# Patient Record
Sex: FEMALE | Race: OTHER | Employment: FULL TIME | URBAN - METROPOLITAN AREA
[De-identification: names, ages, dates, MRNs, and addresses within clinical notes are randomized per-mention and may not be internally consistent; named-entity substitution may affect disease eponyms.]

---

## 2018-03-19 ENCOUNTER — OFFICE VISIT (OUTPATIENT)
Dept: FAMILY MEDICINE CLINIC | Facility: CLINIC | Age: 43
End: 2018-03-19

## 2018-03-19 VITALS
SYSTOLIC BLOOD PRESSURE: 100 MMHG | TEMPERATURE: 98 F | DIASTOLIC BLOOD PRESSURE: 70 MMHG | HEART RATE: 72 BPM | WEIGHT: 124 LBS | RESPIRATION RATE: 18 BRPM | HEIGHT: 61 IN | BODY MASS INDEX: 23.41 KG/M2

## 2018-03-19 DIAGNOSIS — H61.23 BILATERAL IMPACTED CERUMEN: Primary | ICD-10-CM

## 2018-03-19 DIAGNOSIS — H65.92 FLUID LEVEL BEHIND TYMPANIC MEMBRANE OF LEFT EAR: ICD-10-CM

## 2018-03-19 PROCEDURE — 69209 REMOVE IMPACTED EAR WAX UNI: CPT | Performed by: FAMILY MEDICINE

## 2018-03-19 PROCEDURE — 99213 OFFICE O/P EST LOW 20 MIN: CPT | Performed by: FAMILY MEDICINE

## 2018-03-20 NOTE — PROGRESS NOTES
Marissa Duncan is a 44year old female. Patient presents with:  Ear Pain: per pt, left ear pain and ear is clogged    HPI:   Puneet Paulson presents to the office with complaints of ear pain. Ear is bothering her for about a week. Thinks it's clogged.  Thin Still some fullness, but she does have a small middle ear effusion. The patient indicates understanding of these issues and agrees to the plan. The patient is asked to return if symptoms persist or worsen.     Bilateral impacted cerumen  (primary encount

## 2019-05-24 ENCOUNTER — TELEPHONE (OUTPATIENT)
Dept: FAMILY MEDICINE CLINIC | Facility: CLINIC | Age: 44
End: 2019-05-24

## 2019-05-24 ENCOUNTER — HOSPITAL ENCOUNTER (OUTPATIENT)
Age: 44
Discharge: HOME | End: 2019-05-24
Attending: EMERGENCY MEDICINE
Payer: COMMERCIAL

## 2019-05-24 VITALS
WEIGHT: 120 LBS | TEMPERATURE: 98 F | OXYGEN SATURATION: 100 % | HEIGHT: 61 IN | BODY MASS INDEX: 22.66 KG/M2 | RESPIRATION RATE: 16 BRPM | DIASTOLIC BLOOD PRESSURE: 70 MMHG | HEART RATE: 60 BPM | SYSTOLIC BLOOD PRESSURE: 120 MMHG

## 2019-05-24 DIAGNOSIS — G43.909 MIGRAINE WITHOUT STATUS MIGRAINOSUS, NOT INTRACTABLE, UNSPECIFIED MIGRAINE TYPE: Primary | ICD-10-CM

## 2019-05-24 PROCEDURE — 99212 OFFICE O/P EST SF 10 MIN: CPT

## 2019-05-24 NOTE — ED INITIAL ASSESSMENT (HPI)
Pt was at work today and working on computer. Pt states started with headache, pain to eyes. Pt with dizziness and nausea. Pt left work. Pt vomited x1. Pt took motrin and took a nap. Pt states headache resolved but feels \"congested\" to head.   Pt ca

## 2019-05-24 NOTE — TELEPHONE ENCOUNTER
Patient schedule for an appt today with Dr Vance Rowland for \"dizziness and feeling of passing out\". Call to patient. States she was at work at her desk and felt dizzy, had horrible constant pain in the back of her head-no injury-no hx of migraines.   Drove home

## 2019-05-24 NOTE — ED PROVIDER NOTES
Patient Seen in: [id number] [facility] [place]    History   Patient presents with:  Headache (neurologic)    Stated Complaint: headache type pain/nausea    HPI    This is a 81-year-old female complaint of headache the patient states she was at work [facility] Exam    Patient is alert orient x3 no acute distress HEENT exam pupils are equal round react light extra muscles intact oropharynx clear tympanic membranes normal the neck is supple there is no nuchal rigidity lymphadenopathy or JVD lungs are clear to ausc

## 2020-02-03 ENCOUNTER — HOSPITAL ENCOUNTER (OUTPATIENT)
Age: 45
Discharge: HOME | End: 2020-02-03
Attending: FAMILY MEDICINE
Payer: COMMERCIAL

## 2020-02-03 VITALS
TEMPERATURE: 98 F | RESPIRATION RATE: 16 BRPM | HEART RATE: 66 BPM | WEIGHT: 124 LBS | OXYGEN SATURATION: 99 % | BODY MASS INDEX: 23 KG/M2 | SYSTOLIC BLOOD PRESSURE: 124 MMHG | DIASTOLIC BLOOD PRESSURE: 83 MMHG

## 2020-02-03 DIAGNOSIS — M77.8 TENDINITIS OF EXTENSOR TENDON OF RIGHT HAND: Primary | ICD-10-CM

## 2020-02-03 PROCEDURE — 29130 APPL FINGER SPLINT STATIC: CPT

## 2020-02-03 PROCEDURE — 99212 OFFICE O/P EST SF 10 MIN: CPT

## 2020-02-03 PROCEDURE — 99213 OFFICE O/P EST LOW 20 MIN: CPT

## 2020-02-04 NOTE — ED INITIAL ASSESSMENT (HPI)
Pt here c/o rt hand pain for last month. Denies any injury. States pain is a shooting pain from 3rd finger down the hand.

## 2020-02-04 NOTE — ED PROVIDER NOTES
Patient Seen in: [address] [facility] [place]      History   Patient presents with:  Hand Pain    Stated Complaint: hand pain    HPI    **43-year-old female presents to the immediate care today with chief complaints of pain along the extensor tendon o No swelling. No crepitus. No ecchymosis or bruising. No significant tenderness over the third phalanx. Dorsiflexion of the index finger reproduces the pain. Likely extensor tendinitis of the right third digit. Tendons are intact. Good cap refill.   Magui Khan

## 2023-07-31 ENCOUNTER — HOSPITAL ENCOUNTER (OUTPATIENT)
Age: 48
Discharge: HOME | End: 2023-07-31

## 2023-07-31 VITALS
HEIGHT: 61 IN | HEART RATE: 63 BPM | OXYGEN SATURATION: 98 % | RESPIRATION RATE: 18 BRPM | SYSTOLIC BLOOD PRESSURE: 127 MMHG | WEIGHT: 126 LBS | BODY MASS INDEX: 23.79 KG/M2 | DIASTOLIC BLOOD PRESSURE: 74 MMHG | TEMPERATURE: 98 F

## 2023-07-31 DIAGNOSIS — H61.21 IMPACTED CERUMEN OF RIGHT EAR: Primary | ICD-10-CM

## 2023-07-31 PROCEDURE — 99203 OFFICE O/P NEW LOW 30 MIN: CPT | Performed by: PHYSICIAN ASSISTANT

## 2023-07-31 NOTE — DISCHARGE INSTRUCTIONS
No Qtips or airpods into the canal   Follow up with primary care doctor  Return to the ER if symptoms worsen

## 2023-07-31 NOTE — ED INITIAL ASSESSMENT (HPI)
Pt thinks that she has an earwax that was pushed into the right ear.  Pt states noise is muffled and now having pain

## 2024-06-05 ENCOUNTER — APPOINTMENT (OUTPATIENT)
Dept: CT IMAGING | Age: 49
End: 2024-06-05
Attending: NURSE PRACTITIONER

## 2024-06-05 ENCOUNTER — HOSPITAL ENCOUNTER (OUTPATIENT)
Age: 49
Discharge: HOME | End: 2024-06-05

## 2024-06-05 VITALS
SYSTOLIC BLOOD PRESSURE: 111 MMHG | OXYGEN SATURATION: 100 % | WEIGHT: 130 LBS | HEART RATE: 58 BPM | BODY MASS INDEX: 24.55 KG/M2 | RESPIRATION RATE: 18 BRPM | HEIGHT: 61 IN | TEMPERATURE: 98 F | DIASTOLIC BLOOD PRESSURE: 76 MMHG

## 2024-06-05 DIAGNOSIS — S00.83XA CONTUSION OF FACE, INITIAL ENCOUNTER: ICD-10-CM
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
## 2024-06-05 DIAGNOSIS — R19.7 DIARRHEA, UNSPECIFIED TYPE: Primary | ICD-10-CM

## 2024-06-05 DIAGNOSIS — S09.90XA INJURY OF HEAD, INITIAL ENCOUNTER: ICD-10-CM

## 2024-06-05 DIAGNOSIS — R55 VAGAL REACTION: ICD-10-CM

## 2024-06-05 LAB
#MXD IC: 0.3 X10ˆ3/UL (ref 0.1–1)
ATRIAL RATE: 59 BPM
B-HCG UR QL: NEGATIVE
BILIRUB UR QL STRIP: NEGATIVE
BUN BLD-MCNC: 11 MG/DL (ref 7–18)
CHLORIDE BLD-SCNC: 104 MMOL/L (ref 98–112)
CLARITY UR: CLEAR
CO2 BLD-SCNC: 23 MMOL/L (ref 21–32)
COLOR UR: YELLOW
CREAT BLD-MCNC: 0.6 MG/DL
EGFRCR SERPLBLD CKD-EPI 2021: 111 ML/MIN/1.73M2 (ref 60–?)
GLUCOSE BLD-MCNC: 106 MG/DL (ref 70–99)
GLUCOSE UR STRIP-MCNC: NEGATIVE MG/DL
HCT VFR BLD AUTO: 40.6 %
HCT VFR BLD CALC: 42 %
HGB BLD-MCNC: 13.3 G/DL
ISTAT IONIZED CALCIUM FOR CHEM 8: 1.24 MMOL/L (ref 1.12–1.32)
KETONES UR STRIP-MCNC: NEGATIVE MG/DL
LEUKOCYTE ESTERASE UR QL STRIP: NEGATIVE
LYMPHOCYTES # BLD AUTO: 1.8 X10ˆ3/UL (ref 1–4)
LYMPHOCYTES NFR BLD AUTO: 23.7 %
MCH RBC QN AUTO: 29.8 PG (ref 26–34)
MCHC RBC AUTO-ENTMCNC: 32.8 G/DL (ref 31–37)
MCV RBC AUTO: 91 FL (ref 80–100)
MIXED CELL %: 4.6 %
NEUTROPHILS # BLD AUTO: 5.4 X10ˆ3/UL (ref 1.5–7.7)
NEUTROPHILS NFR BLD AUTO: 71.7 %
NITRITE UR QL STRIP: NEGATIVE
P AXIS: 65 DEGREES
P-R INTERVAL: 166 MS
PH UR STRIP: 5 [PH]
PLATELET # BLD AUTO: 308 X10ˆ3/UL (ref 150–450)
POCT INFLUENZA A: NEGATIVE
POCT INFLUENZA B: NEGATIVE
POTASSIUM BLD-SCNC: 4 MMOL/L (ref 3.6–5.1)
PROT UR STRIP-MCNC: NEGATIVE MG/DL
Q-T INTERVAL: 400 MS
QRS DURATION: 84 MS
QTC CALCULATION (BEZET): 396 MS
R AXIS: 15 DEGREES
RBC # BLD AUTO: 4.46 X10ˆ6/UL
SODIUM BLD-SCNC: 139 MMOL/L (ref 136–145)
SP GR UR STRIP: >=1.03
T AXIS: 67 DEGREES
TROPONIN I BLD-MCNC: <0.02 NG/ML
UROBILINOGEN UR STRIP-ACNC: <2 MG/DL
VENTRICULAR RATE: 59 BPM
WBC # BLD AUTO: 7.5 X10ˆ3/UL (ref 4–11)

## 2024-06-05 PROCEDURE — 93000 ELECTROCARDIOGRAM COMPLETE: CPT | Performed by: NURSE PRACTITIONER

## 2024-06-05 PROCEDURE — 80047 BASIC METABLC PNL IONIZED CA: CPT | Performed by: NURSE PRACTITIONER

## 2024-06-05 PROCEDURE — 76376 3D RENDER W/INTRP POSTPROCES: CPT | Performed by: NURSE PRACTITIONER

## 2024-06-05 PROCEDURE — 70486 CT MAXILLOFACIAL W/O DYE: CPT | Performed by: NURSE PRACTITIONER

## 2024-06-05 PROCEDURE — 99204 OFFICE O/P NEW MOD 45 MIN: CPT | Performed by: NURSE PRACTITIONER

## 2024-06-05 PROCEDURE — 96365 THER/PROPH/DIAG IV INF INIT: CPT | Performed by: NURSE PRACTITIONER

## 2024-06-05 PROCEDURE — 81025 URINE PREGNANCY TEST: CPT | Performed by: NURSE PRACTITIONER

## 2024-06-05 PROCEDURE — 85025 COMPLETE CBC W/AUTO DIFF WBC: CPT | Performed by: NURSE PRACTITIONER

## 2024-06-05 PROCEDURE — 70450 CT HEAD/BRAIN W/O DYE: CPT | Performed by: NURSE PRACTITIONER

## 2024-06-05 PROCEDURE — 81002 URINALYSIS NONAUTO W/O SCOPE: CPT | Performed by: NURSE PRACTITIONER

## 2024-06-05 PROCEDURE — 87502 INFLUENZA DNA AMP PROBE: CPT | Performed by: NURSE PRACTITIONER

## 2024-06-05 PROCEDURE — 84484 ASSAY OF TROPONIN QUANT: CPT | Performed by: NURSE PRACTITIONER

## 2024-06-05 RX ORDER — SODIUM CHLORIDE 9 MG/ML
1000 INJECTION, SOLUTION INTRAVENOUS ONCE
Status: COMPLETED | OUTPATIENT
Start: 2024-06-05 | End: 2024-06-05

## 2024-06-05 RX ORDER — ONDANSETRON 2 MG/ML
4 INJECTION INTRAMUSCULAR; INTRAVENOUS ONCE
Status: COMPLETED | OUTPATIENT
Start: 2024-06-05 | End: 2024-06-05

## 2024-06-05 NOTE — DISCHARGE INSTRUCTIONS
Please increase your electrolyte rich fluids while you are experiencing loose stool.  Please follow-up with your primary care provider tomorrow.  If you develop any projectile vomiting or severe headache with visual changes please go to the emergency room.

## 2024-06-05 NOTE — ED INITIAL ASSESSMENT (HPI)
Patient reports that this morning was sitting on toilet having severe abdominal cramping \"like I was going to have diarrhea\" and passed out.   heard patient fall and found patient unresponsive on floor. Patient states she \"blacked out\"  Patient states she had diarrhea after passing out and now is nauseated and fatigued.

## 2024-06-05 NOTE — ED PROVIDER NOTES
Patient Seen in: [facility]      History     Chief Complaint   Patient presents with    Syncope    Nausea/Vomiting/Diarrhea     Stated Complaint: passed out fall concussion    Subjective:   HPI    49-year-old female presents today with complaints of a syncopal episode that happened this morning.  Patient states she woke up dazed and confused and felt like she was going to have diarrhea so she went to the bathroom and was having severe abdominal pain and prayed to Alek to take the pain away and then she woke up on the floor facedown.  Patient's  is present with her and states that he heard a thump and ran upstairs and she was facedown on the bathroom floor.  Patient states that she came to and had pain to the left side of her face and the abdominal pain was gone.  Patient states that she did have diarrhea.  Patient denies any known COVID or flu exposure.  Patient denies any history of concussion.  Patient denies any recent travel.  Patient denies any family history of cardiac events.  Patient states that she still feels nauseous.  Patient states that her last period was months ago.  Patient states she is taken pregnancy test and they were all negative.    Objective:   History reviewed. No pertinent past medical history.           History reviewed. No pertinent surgical history.             Social History     Socioeconomic History    Marital status:    Tobacco Use    Smoking status: Never    Smokeless tobacco: Never   Substance and Sexual Activity    Alcohol use: No    Drug use: No              Review of Systems   Constitutional: Negative.    HENT: Negative.     Eyes: Negative.    Respiratory: Negative.     Cardiovascular: Negative.    Gastrointestinal:  Positive for diarrhea and nausea.   Endocrine: Negative.    Genitourinary: Negative.    Musculoskeletal: Negative.    Skin: Negative.    Allergic/Immunologic: Negative.    Neurological:  Positive for syncope and headaches.   Hematological:  Negative.    Psychiatric/Behavioral: Negative.         Positive for stated complaint: passed out fall concussion  Other systems are as noted in HPI.  Constitutional and vital signs reviewed.      All other systems reviewed and negative except as noted above.    Physical Exam     ED Triage Vitals [06/05/24 0818]   /85   Pulse 73   Resp 18   Temp 97.3 °F (36.3 °C)   Temp src Temporal   SpO2 99 %   O2 Device None (Room air)       Current Vitals:   Vital Signs  BP: (!) 111/91  Pulse: 69  Resp: 18  Temp: 97.3 °F (36.3 °C)  Temp src: Temporal    Oxygen Therapy  SpO2: 99 %  O2 Device: None (Room air)            Physical Exam  Vitals and nursing note reviewed. Exam conducted with a chaperone present.   Constitutional:       Appearance: She is well-developed.   HENT:      Head:      Comments: Ecchymosis appreciated to the left side of the face.  Negative Rivera sign.     Mouth/Throat:      Mouth: Mucous membranes are moist.      Pharynx: Oropharynx is clear.   Eyes:      Extraocular Movements: Extraocular movements intact.      Pupils: Pupils are equal, round, and reactive to light.   Cardiovascular:      Rate and Rhythm: Normal rate and regular rhythm.      Heart sounds: Normal heart sounds.   Pulmonary:      Effort: Pulmonary effort is normal.      Breath sounds: Normal breath sounds.   Abdominal:      General: Abdomen is flat. Bowel sounds are normal.      Palpations: Abdomen is soft.      Tenderness: There is no abdominal tenderness.   Skin:     Capillary Refill: Capillary refill takes less than 2 seconds.   Neurological:      General: No focal deficit present.      Mental Status: She is alert and oriented to person, place, and time.      Comments: No acute neurological deficit appreciated.   Psychiatric:         Mood and Affect: Mood normal.             ED Course     Labs Reviewed   [facility] POCT URINALYSIS DIPSTICK - Abnormal; Notable for the following components:       Result Value    Blood, Urine Trace-Intact (*)      All other components within normal limits   POCT ISTAT CHEM8 CARTRIDGE - Abnormal; Notable for the following components:    ISTAT Glucose 106 (*)     All other components within normal limits   POCT PREGNANCY URINE - Normal   ISTAT TROPONIN - Normal   POCT FLU TEST - Normal    Narrative:     This assay is a rapid molecular in vitro test utilizing nucleic acid amplification of influenza A and B viral RNA.   POCT CBC     EKG    Rate, intervals and axes as noted on EKG Report.  Rate: 59  Rhythm: Sinus Rhythm  Reading: SB                          MDM        49-year-old female presents today with complaints of a syncopal episode that happened this morning.  Patient states she woke up dazed and confused and felt like she was going to have diarrhea so she went to the bathroom and was having severe abdominal pain and prayed to Alek to take the pain away and then she woke up on the floor facedown.  Patient's  is present with her and states that he heard a thump and ran upstairs and she was facedown on the bathroom floor.  Patient states that she came to and had pain to the left side of her face and the abdominal pain was gone.  Patient states that she did have diarrhea.  Patient denies any known COVID or flu exposure.  Patient denies any history of concussion.  Patient denies any recent travel.  Patient denies any family history of cardiac events.  Patient states that she still feels nauseous.  Patient states that her last period was months ago.  Patient states she is taken pregnancy test and they were all negative.  Vital signs: Please see EMR.  Physical exam: Please see exam.  Differential diagnosis: Concussion, intracranial process, fracture, cardiac event, dehydration, influenza.  Recent Results (from the past 24 hour(s))   POCT Flu Test    Collection Time: 06/05/24  8:32 AM    Specimen: Nares; Other   Result Value Ref Range    POCT INFLUENZA A Negative Negative    POCT INFLUENZA B Negative Negative   EKG 12  Lead    Collection Time: 06/05/24  8:38 AM   Result Value Ref Range    Ventricular rate 59 BPM    Atrial rate 59 BPM    P-R Interval 166 ms    QRS Duration 84 ms    Q-T Interval 400 ms    QTC Calculation (Bezet) 396 ms    P Axis 65 degrees    R Axis 15 degrees    T Axis 67 degrees   POCT Urinalysis Dipstick    Collection Time: 06/05/24  8:51 AM   Result Value Ref Range    Urine Color Yellow Yellow    Urine Clarity Clear Clear    Specific Gravity, Urine >=1.030 1.005 - 1.030    PH, Urine 5.0 5.0 - 8.0    Protein urine Negative Negative mg/dL    Glucose, Urine Negative Negative mg/dL    Ketone, Urine Negative Negative mg/dL    Bilirubin, Urine Negative Negative    Blood, Urine Trace-Intact (A) Negative    Nitrite Urine Negative Negative    Urobilinogen urine <2.0 <2.0 mg/dL    Leukocyte esterase urine Negative Negative   POCT CBC    Collection Time: 06/05/24  8:53 AM   Result Value Ref Range    WBC IC 7.5 4.0 - 11.0 x10ˆ3/uL    RBC IC 4.46 3.80 - 5.30 X10ˆ6/uL    HGB IC 13.3 12.0 - 16.0 g/dL    HCT IC 40.6 35.0 - 48.0 %    MCV IC 91.0 80.0 - 100.0 fL    MCH IC 29.8 26.0 - 34.0 pg    MCHC IC 32.8 31.0 - 37.0 g/dL    PLT .0 150.0 - 450.0 X10ˆ3/uL    # Neutrophil 5.4 1.5 - 7.7 X10ˆ3/uL    # Lymphocyte 1.8 1.0 - 4.0 X10ˆ3/uL    # Mixed Cells 0.3 0.1 - 1.0 X10ˆ3/uL    Neutrophil % 71.7 %    Lymphocyte % 23.7 %    Mixed Cell % 4.6 %   POCT Pregnancy, Urine    Collection Time: 06/05/24  8:54 AM   Result Value Ref Range    POCT Urine Pregnancy Negative Negative   POCT ISTAT chem8 cartridge    Collection Time: 06/05/24  8:58 AM   Result Value Ref Range    ISTAT Sodium 139 136 - 145 mmol/L    ISTAT BUN 11 7 - 18 mg/dL    ISTAT Potassium 4.0 3.6 - 5.1 mmol/L    ISTAT Chloride 104 98 - 112 mmol/L    ISTAT Ionized Calcium 1.24 1.12 - 1.32 mmol/L    ISTAT Hematocrit 42 34 - 50 %    ISTAT Glucose 106 (H) 70 - 99 mg/dL    ISTAT TCO2 23 21 - 32 mmol/L    ISTAT Creatinine 0.60 0.55 - 1.02 mg/dL    eGFR-Cr 111 >=60 mL/min/1.73m2    ISTAT Troponin    Collection Time: 06/05/24  9:00 AM   Result Value Ref Range    ISTAT Troponin <0.02 <0.045 ng/mL ng/mL     Vital signs not exhibiting orthostatic changes.    Heart Score:    HEART Score      Title      Criteria Score   Age: 45-64 Age Score: 1   History: Slightly Suspicious Hx Score: 0     EKG: Normal EKG Score: 0   HTN: No   Hypercholesterolemia: No   Atherosclerosis/PVD: No     DM: No   BMI>30kg/m2: No   Smoking: No   Family History: No         Other Risk Factor Score: 0               Lab Results   Component Value Date    ITROP <0.02 06/05/2024         HEART Score: 1        Risk of adverse cardiac event is 0.9-1.7%          [place] CT: Consider for CT.   CT FACIAL BONES (CPT=70486)    Result Date: 6/5/2024  CONCLUSION:  No acute osseous injuries or soft tissue injuries to the facial regions or orbits identified.  No evidence of sinus or mastoid disease.   LOCATION:  [facility]   Dictated by (CST): Juan Jasmine DO on 6/05/2024 at 10:25 AM     Finalized by (CST): Juan Jasmine DO on 6/05/2024 at 10:29 AM       CT BRAIN OR HEAD (94377)    Result Date: 6/5/2024  CONCLUSION:  No acute process.  The exam is within normal limits.    LOCATION:  [facility]   Dictated by (CST): Juan Jasmine DO on 6/05/2024 at 10:17 AM     Finalized by (CST): Juan Jasmine DO on 6/05/2024 at 10:21 AM      Will diagnose with syncope, vagal response, head injury and diarrhea.  Patient instructed to increase her fluid intake of electrolyte rich fluids and follow-up with primary care provider within 2 to 3 days.  ED precautions given.  Note to Patient  The 21st Century Cures Act makes medical notes like these available to patients in the interest of transparency. However, be advised this is a medical document and is intended as [id number]-[id number] communication; it is written in medical language and may appear blunt, direct, or contain abbreviations or verbiage that are unfamiliar. Medical documents are intended to carry relevant  information, facts as evident, and the clinical opinion of the practitioner.     This report has been produced using speech recognition software, and may contain errors related to grammar, punctuation, spelling, words or phrases unrecognized or not translated appropriately to text; these errors may be referred to the dictating provider for further clarification and/or addendum as needed.                                     Medical Decision Making    49-year-old female presents today with complaints of a syncopal episode that happened this morning.  Patient states she woke up dazed and confused and felt like she was going to have diarrhea so she went to the bathroom and was having severe abdominal pain and prayed to Alek to take the pain away and then she woke up on the floor facedown.  Patient's  is present with her and states that he heard a thump and ran upstairs and she was facedown on the bathroom floor.  Patient states that she came to and had pain to the left side of her face and the abdominal pain was gone.  Patient states that she did have diarrhea.  Patient denies any known COVID or flu exposure.  Patient denies any history of concussion.  Patient denies any recent travel.  Patient denies any family history of cardiac events.  Patient states that she still feels nauseous.  Patient states that her last period was months ago.  Patient states she is taken pregnancy test and they were all negative.    Problems Addressed:  Contusion of face, initial encounter: acute illness or injury  Diarrhea, unspecified type: acute illness or injury  Fall, initial encounter: acute illness or injury    Amount and/or Complexity of Data Reviewed  Labs: ordered. Decision-making details documented in ED Course.  Radiology: ordered. Decision-making details documented in ED Course.  ECG/medicine tests: ordered. Decision-making details documented in ED Course.        Disposition and Plan     Clinical Impression:  1. Diarrhea,  unspecified type    2. Fall, initial encounter    3. Contusion of face, initial encounter         Disposition:  There is no disposition on file for this visit.  There is no disposition time on file for this visit.    Follow-up:  No follow-up provider specified.        Medications Prescribed:  There are no discharge medications for this patient.

## 2025-08-30 ENCOUNTER — HOSPITAL ENCOUNTER (OUTPATIENT)
Age: 50
Discharge: HOME | End: 2025-08-30

## 2025-08-30 VITALS
OXYGEN SATURATION: 98 % | TEMPERATURE: 99 F | SYSTOLIC BLOOD PRESSURE: 133 MMHG | DIASTOLIC BLOOD PRESSURE: 68 MMHG | RESPIRATION RATE: 20 BRPM | HEART RATE: 66 BPM

## 2025-08-30 DIAGNOSIS — R55 SYNCOPE AND COLLAPSE: Primary | ICD-10-CM

## 2025-08-30 LAB
#MXD IC: 0.3 X10ˆ3/UL (ref 0.1–1)
B-HCG UR QL: NEGATIVE
BILIRUB UR QL STRIP: NEGATIVE
BUN BLD-MCNC: 8 MG/DL (ref 7–18)
CHLORIDE BLD-SCNC: 101 MMOL/L (ref 98–112)
CLARITY UR: CLEAR
CO2 BLD-SCNC: 26 MMOL/L (ref 21–32)
COLOR UR: YELLOW
CREAT BLD-MCNC: 0.7 MG/DL (ref 0.55–1.02)
EGFRCR SERPLBLD CKD-EPI 2021: 106 ML/MIN/1.73M2 (ref 60–?)
GLUCOSE BLD-MCNC: 111 MG/DL (ref 70–99)
GLUCOSE UR STRIP-MCNC: NEGATIVE MG/DL
HCT VFR BLD AUTO: 38.3 % (ref 35–48)
HCT VFR BLD CALC: 40 % (ref 34–50)
HGB BLD-MCNC: 12.6 G/DL (ref 12–16)
HGB UR QL STRIP: NEGATIVE
ISTAT IONIZED CALCIUM FOR CHEM 8: 1.19 MMOL/L (ref 1.12–1.32)
KETONES UR STRIP-MCNC: 15 MG/DL
LEUKOCYTE ESTERASE UR QL STRIP: NEGATIVE
LYMPHOCYTES # BLD AUTO: 1.4 X10ˆ3/UL (ref 1–4)
LYMPHOCYTES NFR BLD AUTO: 19.9 %
MCH RBC QN AUTO: 29.4 PG (ref 26–34)
MCHC RBC AUTO-ENTMCNC: 32.9 G/DL (ref 31–37)
MCV RBC AUTO: 89.5 FL (ref 80–100)
MIXED CELL %: 4.9 %
NEUTROPHILS # BLD AUTO: 5.3 X10ˆ3/UL (ref 1.5–7.7)
NEUTROPHILS NFR BLD AUTO: 75.2 %
NITRITE UR QL STRIP: NEGATIVE
PH UR STRIP: 8.5
PLATELET # BLD AUTO: 307 X10ˆ3/UL (ref 150–450)
POTASSIUM BLD-SCNC: 3.6 MMOL/L (ref 3.6–5.1)
PROT UR STRIP-MCNC: 100 MG/DL
RBC # BLD AUTO: 4.28 X10ˆ6/UL (ref 3.8–5.3)
SODIUM BLD-SCNC: 140 MMOL/L (ref 136–145)
SP GR UR STRIP: 1.02
TROPONIN I BLD-MCNC: 0.03 NG/ML (ref ?–0.05)
UROBILINOGEN UR STRIP-ACNC: <2 MG/DL
WBC # BLD AUTO: 7 X10ˆ3/UL (ref 4–11)

## 2025-08-31 LAB
ATRIAL RATE: 59 BPM
P AXIS: 64 DEGREES
P-R INTERVAL: 164 MS
Q-T INTERVAL: 408 MS
QRS DURATION: 82 MS
QTC CALCULATION (BEZET): 403 MS
R AXIS: 12 DEGREES
T AXIS: 61 DEGREES
VENTRICULAR RATE: 59 BPM

## (undated) NOTE — LETTER
Date & Time: 6/5/2024, 10:40 AM  Patient: Peri Calles  Encounter Provider(s):    Shahana Haas APRN       To Whom It May Concern:    Peri Calles was seen and treated in our department on 6/5/2024. She should not return to school until 6/6 .    If you have any questions or concerns, please do not hesitate to call.        _____________________________  Physician/APC Signature